# Patient Record
Sex: FEMALE | Race: WHITE
[De-identification: names, ages, dates, MRNs, and addresses within clinical notes are randomized per-mention and may not be internally consistent; named-entity substitution may affect disease eponyms.]

---

## 2022-07-24 ENCOUNTER — HOSPITAL ENCOUNTER (INPATIENT)
Dept: HOSPITAL 93 - ER | Age: 32
LOS: 4 days | Discharge: HOME | DRG: 416 | End: 2022-07-28
Attending: INTERNAL MEDICINE | Admitting: INTERNAL MEDICINE
Payer: COMMERCIAL

## 2022-07-24 VITALS — HEIGHT: 71 IN | WEIGHT: 220 LBS | BODY MASS INDEX: 30.8 KG/M2

## 2022-07-24 DIAGNOSIS — M32.9: ICD-10-CM

## 2022-07-24 DIAGNOSIS — K81.0: Primary | ICD-10-CM

## 2022-07-24 DIAGNOSIS — Z20.822: ICD-10-CM

## 2022-07-24 NOTE — NUR
EVALUA PTE. SE EDUCA SOBRE [place] MEDICO LA CUAL REFIERE COMPRENDER. SE
COLECTAN MUESTRAS DE LABORATORIO BAJO MEDIDAS ASEPTICAS. SE ADMINISTRAN
MEDICAMNETOS KEYA ORDEN MEDICA. PTE MANEJADA POR .

## 2022-07-25 PROCEDURE — 0FT40ZZ RESECTION OF GALLBLADDER, OPEN APPROACH: ICD-10-PCS | Performed by: SPECIALIST

## 2022-07-25 NOTE — NUR
SE RECIBE PACIENTE FEMENINA ALERTA Y ORIENTADA EN LAS SPARKLE ESFERAS LA CUAL DE
OBSERVA CON CANALIZACION PATENTE, LIMPIA Y SECA, IVF 0.9NSS AT 200ML/HR Y
PENDIENTE A CONSULTA COPN DR POLLARD. SE MANTIENE PACIENTE EN OBSERVACION.